# Patient Record
(demographics unavailable — no encounter records)

---

## 2020-10-12 NOTE — RAD
OB ultrasound greater than 14 weeks 10/12/2020

 

Clinical History: Fetal survey.

 

Technique: A real-time ultrasound examination of the gravid uterus was 

performed. Multiple images were obtained.

 

Findings: Comparison study is dated 10/10/2020.

 

There is a single living IUP. The fetus is in a cephalic position. Fetal 

cardiac and somatic activity is seen. The maternal cervix is poorly 

visualized due to the position of the fetal head. The placenta is 

posterior. No focal abnormality of the placenta is seen. The area of 

probable contraction seen on the patient's previous ultrasound has 

resolved. The amniotic fluid volume is within normal limits. Neither 

maternal ovary is visualized. No adnexal mass is seen.

 

The following fetal measurements were obtained:

 

BPD 6.86cm 27 weeks 4 days

 

HC 25.53 cm 27weeks 5 days

 

AC 20.91 cm 25weeks 3 days

 

FL 4.77 cm 26 weeks 0 days

 

The estimated gestational age by ultrasound is 26 weeks 5 days plus or 

minus a standard deviation of 14 days. The estimated date of delivery by 

ultrasound on today's study is 1/13/2021. Since the previous examination 

has been appropriate interval fetal growth.

 

No fetal abnormality is seen. Specifically the fetal stomach, bladder, 

kidneys, 3 vessel cord and cord insertion, four-chamber heart, and 

visualized fetal brain are within normal limits. The fetal spine( 

excluding the sacrum) is well-visualized and is within normal limits. The 

fetal face is not well evaluated due to fetal position. The fetal left arm

is not visualized due to fetal position. The remaining fetal extremities 

are well-visualized and are within normal limits.

 

Impression:

1. Single living IUP with an estimated gestational age by ultrasound of 26

weeks5 days +/- a standard deviation of 14 days. Since the previous 

examination has been appropriate interval fetal growth.

2. . The placental contraction seen on the previous study has resolved.

 

Electronically signed by: Sergio Vinson MD (10/12/2020 3:56 PM) [id number]